# Patient Record
Sex: MALE | Race: WHITE | HISPANIC OR LATINO | Employment: OTHER | URBAN - METROPOLITAN AREA
[De-identification: names, ages, dates, MRNs, and addresses within clinical notes are randomized per-mention and may not be internally consistent; named-entity substitution may affect disease eponyms.]

---

## 2024-09-13 ENCOUNTER — HOSPITAL ENCOUNTER (EMERGENCY)
Facility: HOSPITAL | Age: 59
Discharge: HOME | End: 2024-09-13
Attending: EMERGENCY MEDICINE

## 2024-09-13 VITALS
RESPIRATION RATE: 18 BRPM | DIASTOLIC BLOOD PRESSURE: 97 MMHG | OXYGEN SATURATION: 97 % | HEART RATE: 87 BPM | HEIGHT: 70 IN | TEMPERATURE: 98 F | BODY MASS INDEX: 31.5 KG/M2 | WEIGHT: 220 LBS | SYSTOLIC BLOOD PRESSURE: 152 MMHG

## 2024-09-13 DIAGNOSIS — Z71.1 CONCERN ABOUT STD IN MALE WITHOUT DIAGNOSIS: Primary | ICD-10-CM

## 2024-09-13 DIAGNOSIS — R30.0 DYSURIA: ICD-10-CM

## 2024-09-13 DIAGNOSIS — R36.9 PENILE DISCHARGE: ICD-10-CM

## 2024-09-13 DIAGNOSIS — N30.00 ACUTE CYSTITIS WITHOUT HEMATURIA: ICD-10-CM

## 2024-09-13 LAB
BILIRUB UR QL STRIP: NEGATIVE
CLARITY UR: ABNORMAL
COLOR UR: YELLOW
GLUCOSE UR QL STRIP: NEGATIVE
HGB UR QL STRIP: ABNORMAL
KETONES UR QL STRIP: NEGATIVE
LEUKOCYTE ESTERASE UR QL STRIP: ABNORMAL
MICROSCOPIC COMMENT: ABNORMAL
NITRITE UR QL STRIP: NEGATIVE
PH UR STRIP: 6 [PH] (ref 5–8)
PROT UR QL STRIP: NEGATIVE
RBC #/AREA URNS HPF: 4 /HPF (ref 0–4)
SP GR UR STRIP: 1.02 (ref 1–1.03)
URN SPEC COLLECT METH UR: ABNORMAL
UROBILINOGEN UR STRIP-ACNC: NEGATIVE EU/DL
WBC #/AREA URNS HPF: >100 /HPF (ref 0–5)

## 2024-09-13 PROCEDURE — 99284 EMERGENCY DEPT VISIT MOD MDM: CPT | Mod: 25

## 2024-09-13 PROCEDURE — 87086 URINE CULTURE/COLONY COUNT: CPT

## 2024-09-13 PROCEDURE — 96372 THER/PROPH/DIAG INJ SC/IM: CPT

## 2024-09-13 PROCEDURE — 81000 URINALYSIS NONAUTO W/SCOPE: CPT

## 2024-09-13 PROCEDURE — 25000003 PHARM REV CODE 250

## 2024-09-13 PROCEDURE — 87591 N.GONORRHOEAE DNA AMP PROB: CPT

## 2024-09-13 PROCEDURE — 63600175 PHARM REV CODE 636 W HCPCS

## 2024-09-13 RX ORDER — CEPHALEXIN 500 MG/1
500 CAPSULE ORAL 2 TIMES DAILY
Qty: 10 CAPSULE | Refills: 0 | Status: SHIPPED | OUTPATIENT
Start: 2024-09-13 | End: 2024-09-18

## 2024-09-13 RX ORDER — DOXYCYCLINE 100 MG/1
100 CAPSULE ORAL 2 TIMES DAILY
Qty: 20 CAPSULE | Refills: 0 | Status: SHIPPED | OUTPATIENT
Start: 2024-09-13 | End: 2024-09-23

## 2024-09-13 RX ORDER — DOXYCYCLINE HYCLATE 100 MG
100 TABLET ORAL
Status: COMPLETED | OUTPATIENT
Start: 2024-09-13 | End: 2024-09-13

## 2024-09-13 RX ORDER — LIDOCAINE HYDROCHLORIDE 10 MG/ML
5 INJECTION, SOLUTION EPIDURAL; INFILTRATION; INTRACAUDAL; PERINEURAL
Status: COMPLETED | OUTPATIENT
Start: 2024-09-13 | End: 2024-09-13

## 2024-09-13 RX ORDER — CEFTRIAXONE 500 MG/1
500 INJECTION, POWDER, FOR SOLUTION INTRAMUSCULAR; INTRAVENOUS
Status: COMPLETED | OUTPATIENT
Start: 2024-09-13 | End: 2024-09-13

## 2024-09-13 RX ADMIN — DOXYCYCLINE HYCLATE 100 MG: 100 TABLET, COATED ORAL at 09:09

## 2024-09-13 RX ADMIN — CEFTRIAXONE SODIUM 500 MG: 500 INJECTION, POWDER, FOR SOLUTION INTRAMUSCULAR; INTRAVENOUS at 09:09

## 2024-09-13 RX ADMIN — LIDOCAINE HYDROCHLORIDE 50 MG: 10 INJECTION, SOLUTION EPIDURAL; INFILTRATION; INTRACAUDAL at 09:09

## 2024-09-13 NOTE — Clinical Note
"Maximus Andrewsbienvenido Dugan was seen and treated in our emergency department on 9/13/2024.  He may return to work on 09/14/2024.       If you have any questions or concerns, please don't hesitate to call.      Lakshmi Montanez PA-C"

## 2024-09-13 NOTE — ED PROVIDER NOTES
Encounter Date: 9/13/2024       History     Chief Complaint   Patient presents with    Penile Discharge     Burning with urination and yellow discharge x 2 days. No abdominal pain. Concern for STI.     Patient is a 59-year-old male with no significant past medical history who presents to emergency room for burning with urination and yellow discharge over the past 2 days.  Patient states that he had unprotected sexual intercourse 2 weeks ago with another individual.  He is concerned for STI at this time.  Denies abdominal pain, fever, body aches, chills, rash, testicular pain, or others at this time.  No treatment attempted prior to arrival.    The history is provided by the patient. No  was used.     Review of patient's allergies indicates:  No Known Allergies  No past medical history on file.  No past surgical history on file.  No family history on file.     Review of Systems   Constitutional:  Negative for chills, diaphoresis, fatigue and fever.   Genitourinary:  Positive for dysuria and penile discharge. Negative for decreased urine volume, difficulty urinating, hematuria, penile swelling, scrotal swelling and testicular pain.   Musculoskeletal:  Negative for arthralgias, joint swelling and myalgias.   Skin:  Negative for color change, rash and wound.   Neurological:  Negative for weakness and numbness.       Physical Exam     Initial Vitals [09/13/24 0830]   BP Pulse Resp Temp SpO2   (!) 152/97 87 18 98.1 °F (36.7 °C) 97 %      MAP       --         Physical Exam    Nursing note and vitals reviewed.  Constitutional: He appears well-developed and well-nourished. He is not diaphoretic. No distress.   Patient well-appearing.  Awake and alert.  No acute distress.  Maintaining airway appropriately.  Speaking in complete sentences.   HENT:   Head: Normocephalic and atraumatic.   Right Ear: External ear normal.   Left Ear: External ear normal.   Eyes: Conjunctivae and EOM are normal.   Neck: Neck  supple.   Normal range of motion.  Pulmonary/Chest: No respiratory distress.   Abdominal: Abdomen is soft. He exhibits no distension. There is no abdominal tenderness. There is no rebound and no guarding.   Musculoskeletal:         General: No tenderness or edema. Normal range of motion.      Cervical back: Normal range of motion and neck supple.     Neurological: He is alert and oriented to person, place, and time. He has normal strength.   Skin: Skin is warm. Capillary refill takes less than 2 seconds.   Psychiatric: He has a normal mood and affect. His behavior is normal. Thought content normal.         ED Course   Procedures  Labs Reviewed   URINALYSIS, REFLEX TO URINE CULTURE - Abnormal       Result Value    Specimen UA Urine, Clean Catch      Color, UA Yellow      Appearance, UA Hazy (*)     pH, UA 6.0      Specific Gravity, UA 1.025      Protein, UA Negative      Glucose, UA Negative      Ketones, UA Negative      Bilirubin (UA) Negative      Occult Blood UA 1+ (*)     Nitrite, UA Negative      Urobilinogen, UA Negative      Leukocytes, UA 3+ (*)     Narrative:     Specimen Source->Urine   URINALYSIS MICROSCOPIC - Abnormal    RBC, UA 4      WBC, UA >100 (*)     Microscopic Comment SEE COMMENT      Narrative:     Specimen Source->Urine   CULTURE, URINE   C. TRACHOMATIS/N. GONORRHOEAE BY AMP DNA          Imaging Results    None          Medications   cefTRIAXone injection 500 mg (500 mg Intramuscular Given 9/13/24 0909)   LIDOcaine (PF) 10 mg/ml (1%) injection 50 mg (50 mg Infiltration Given 9/13/24 0909)   doxycycline tablet 100 mg (100 mg Oral Given 9/13/24 0909)     Medical Decision Making  Patient presents to emergency room for dysuria and penile discharge.  Vital signs stable and within normal limits.  Physical exam as stated above.    Differential Diagnosis includes, but is not limited to STI, urethritis, testicular/appendix torsion, epididymitis, orchitis, UTI, kidney stone, urinary retention,  appendicitis, prostatitis, testicular mass/neoplasm, or incarcerated/strangulated hernia.  Patient without concern for STI at this time.  He is afebrile.  No testicular pain.  Unlikely prostatitis. I also do not suspect testicular torsion.  No right lower quadrant abdominal pain that would suggest appendicitis.  No hernia felt on exam.  Urinalysis with evidence of UTI.  Gonorrhea and chlamydia pending at this time.  Clinical presentation and physical exam most suggestive of possible STD.  Patient given ceftriaxone IM and doxycycline in the emergency room.  Will prescribe doxycycline and Keflex to take upon discharge.  Advised on safe sex practices.    I see no indication of an emergent process beyond that addressed during our encounter. Patient stable for discharge at this time. I have counseled the patient regarding follow up with PCP and gave strict return precautions. I have discussed the final diagnosis and gave instructions regarding prescribed medications. Patient verbalized understanding and is agreeable.     Problems Addressed:  Acute cystitis without hematuria: acute illness or injury  Concern about STD in male without diagnosis: acute illness or injury  Dysuria: acute illness or injury  Penile discharge: acute illness or injury    Amount and/or Complexity of Data Reviewed  External Data Reviewed: notes.     Details: No previous visits to review.  Labs: ordered. Decision-making details documented in ED Course.    Risk  Prescription drug management.  Risk Details: Comorbidities taken into consideration during the patient's evaluation and treatment include none.    Social determinants of health taken into consideration during development of our treatment plan include difficulty in obtaining follow-up, obtaining medications, health literacy, access to healthy options for preventative/conservative management, and/or support systems due to, but not limited to, transportation limitations, socioeconomic status,  and environmental factors.                ED Course as of 09/14/24 0934   Fri Sep 13, 2024   0931 Urinalysis, Reflex to Urine Culture Urine, Clean Catch(!)  Urinalysis with 3+ leukocytes.  1+ occult blood. [BJ]   0931 Urinalysis Microscopic(!)  Microscopic urinalysis with greater than 100 white blood cells. [BJ]   0931 Urine culture  Urine culture pending. [BJ]   0931 C. trachomatis/N. gonorrhoeae by AMP DNA [facility]; Urine  Gonorrhea and chlamydia pending. [BJ]      ED Course User Index  [BJ] Lakshmi Montanez PA-C                           Clinical Impression:  Final diagnoses:  [R36.9] Penile discharge  [Z71.1] Concern about STD in male without diagnosis (Primary)  [R30.0] Dysuria  [N30.00] Acute cystitis without hematuria          ED Disposition Condition    Discharge Stable          ED Prescriptions       Medication Sig Dispense Start Date End Date Auth. Provider    cephALEXin (KEFLEX) 500 MG capsule Take 1 capsule (500 mg total) by mouth 2 (two) times a day. for 5 days 10 capsule 9/13/2024 9/18/2024 Lakshmi Montanez PA-C    doxycycline (VIBRAMYCIN) 100 MG Cap Take 1 capsule (100 mg total) by mouth 2 (two) times daily. for 10 days 20 capsule 9/13/2024 9/23/2024 Lakshmi Montanez PA-C          Follow-up Information       Follow up With Specialties Details Why Contact Info    Your doctor  Schedule an appointment as soon as possible for a visit               This note was partially created using [employer] Voice Recognition software. Typographical and content errors may occur with this process. While efforts are made to detect and correct such errors, in some cases errors will persist. For this reason, wording in this document should be considered in the proper context and not strictly verbatim.        Lakshmi Montanez PA-C  09/14/24 0934

## 2024-09-13 NOTE — ED TRIAGE NOTES
Pt reports to ed c c/o burning and yellow penile discharge. Reports having recent unprotected sex. Pt concerned for STI. Denies abdominal pain or fever.

## 2024-09-13 NOTE — DISCHARGE INSTRUCTIONS
Sexually transmitted infections (STIs) are infections spread by sexual contact. This includes genital skin-to-skin contact and vaginal, oral, and anal sex.  Today you were tested for gonorrhea and chlamydia.  This test takes several days to result, so be on the lookout for a call from [facility] to discuss your results.    You were given an antibiotic while in the emergency room by a shot, and prescribed a different antibiotic to take upon discharge.  Please take this for the full duration of 10 days in order to ensure resolution of infection.  While taking the medication, refrain from sexual activity, as you can still spread this infection while taking the antibiotic.  Inform all sexual partners if your results come back positive.  While taking this antibiotic, be sure to eat with taking the medication and do not drink alcohol.    In the future, in order to prevent STIs, use protection such as condoms during intercourse, stay up-to-date on all your vaccines, and get screened regularly if you are sexually active.    Thank you for allowing me and my emergency team to take care of you here today! I hope you feel better soon. Please do not hesitate to return with any additional concerns that may arise from this or any new problem you encounter.    Our goal in the emergency department is to always give you outstanding care and exceptional service. If you receive a survey by mail or e-mail in the next week regarding your experience in our ED, we would greatly appreciate you completing it. Your feedback provides us with a way to recognize our staff who give very good care and it helps us learn how to improve when your experience was below the excellence we aspire to be!    Brook [place], PA-C [facility], [place], and [facility]   Emergency Room Physician Assistant

## 2024-09-14 LAB
BACTERIA UR CULT: NORMAL
BACTERIA UR CULT: NORMAL

## 2024-09-16 LAB
C TRACH DNA SPEC QL NAA+PROBE: NOT DETECTED
N GONORRHOEA DNA SPEC QL NAA+PROBE: DETECTED